# Patient Record
Sex: MALE | Race: WHITE | NOT HISPANIC OR LATINO
[De-identification: names, ages, dates, MRNs, and addresses within clinical notes are randomized per-mention and may not be internally consistent; named-entity substitution may affect disease eponyms.]

---

## 2021-06-15 ENCOUNTER — TRANSCRIPTION ENCOUNTER (OUTPATIENT)
Age: 46
End: 2021-06-15

## 2021-12-30 ENCOUNTER — TRANSCRIPTION ENCOUNTER (OUTPATIENT)
Age: 46
End: 2021-12-30

## 2022-09-28 ENCOUNTER — EMERGENCY (EMERGENCY)
Facility: HOSPITAL | Age: 47
LOS: 1 days | Discharge: HOME | End: 2022-09-28
Attending: EMERGENCY MEDICINE
Payer: COMMERCIAL

## 2022-09-28 VITALS
HEART RATE: 100 BPM | OXYGEN SATURATION: 97 % | RESPIRATION RATE: 18 BRPM | TEMPERATURE: 99 F | DIASTOLIC BLOOD PRESSURE: 97 MMHG | SYSTOLIC BLOOD PRESSURE: 155 MMHG | WEIGHT: 235.01 LBS

## 2022-09-28 VITALS
TEMPERATURE: 98 F | OXYGEN SATURATION: 98 % | RESPIRATION RATE: 18 BRPM | HEART RATE: 67 BPM | DIASTOLIC BLOOD PRESSURE: 75 MMHG | SYSTOLIC BLOOD PRESSURE: 117 MMHG

## 2022-09-28 LAB
ALBUMIN SERPL ELPH-MCNC: 4.4 G/DL — SIGNIFICANT CHANGE UP (ref 3.3–5)
ALP SERPL-CCNC: 78 U/L — SIGNIFICANT CHANGE UP (ref 40–120)
ALT FLD-CCNC: 21 U/L — SIGNIFICANT CHANGE UP (ref 10–45)
ANION GAP SERPL CALC-SCNC: 13 MMOL/L — SIGNIFICANT CHANGE UP (ref 5–17)
APTT BLD: 29.4 SEC — SIGNIFICANT CHANGE UP (ref 27.5–35.5)
AST SERPL-CCNC: 16 U/L — SIGNIFICANT CHANGE UP (ref 10–40)
BASOPHILS # BLD AUTO: 0.02 K/UL — SIGNIFICANT CHANGE UP (ref 0–0.2)
BASOPHILS NFR BLD AUTO: 0.4 % — SIGNIFICANT CHANGE UP (ref 0–2)
BILIRUB SERPL-MCNC: 0.5 MG/DL — SIGNIFICANT CHANGE UP (ref 0.2–1.2)
BUN SERPL-MCNC: 15 MG/DL — SIGNIFICANT CHANGE UP (ref 7–23)
CALCIUM SERPL-MCNC: 8.9 MG/DL — SIGNIFICANT CHANGE UP (ref 8.4–10.5)
CHLORIDE SERPL-SCNC: 105 MMOL/L — SIGNIFICANT CHANGE UP (ref 96–108)
CO2 SERPL-SCNC: 22 MMOL/L — SIGNIFICANT CHANGE UP (ref 22–31)
CREAT SERPL-MCNC: 0.9 MG/DL — SIGNIFICANT CHANGE UP (ref 0.5–1.3)
D DIMER BLD IA.RAPID-MCNC: <150 NG/ML DDU — SIGNIFICANT CHANGE UP
EGFR: 107 ML/MIN/1.73M2 — SIGNIFICANT CHANGE UP
EOSINOPHIL # BLD AUTO: 0.15 K/UL — SIGNIFICANT CHANGE UP (ref 0–0.5)
EOSINOPHIL NFR BLD AUTO: 2.7 % — SIGNIFICANT CHANGE UP (ref 0–6)
GLUCOSE SERPL-MCNC: 104 MG/DL — HIGH (ref 70–99)
HCT VFR BLD CALC: 41.4 % — SIGNIFICANT CHANGE UP (ref 39–50)
HGB BLD-MCNC: 14.1 G/DL — SIGNIFICANT CHANGE UP (ref 13–17)
IMM GRANULOCYTES NFR BLD AUTO: 0.5 % — SIGNIFICANT CHANGE UP (ref 0–0.9)
INR BLD: 1.09 RATIO — SIGNIFICANT CHANGE UP (ref 0.88–1.16)
LYMPHOCYTES # BLD AUTO: 1.71 K/UL — SIGNIFICANT CHANGE UP (ref 1–3.3)
LYMPHOCYTES # BLD AUTO: 30.6 % — SIGNIFICANT CHANGE UP (ref 13–44)
MCHC RBC-ENTMCNC: 29.7 PG — SIGNIFICANT CHANGE UP (ref 27–34)
MCHC RBC-ENTMCNC: 34.1 GM/DL — SIGNIFICANT CHANGE UP (ref 32–36)
MCV RBC AUTO: 87.2 FL — SIGNIFICANT CHANGE UP (ref 80–100)
MONOCYTES # BLD AUTO: 0.53 K/UL — SIGNIFICANT CHANGE UP (ref 0–0.9)
MONOCYTES NFR BLD AUTO: 9.5 % — SIGNIFICANT CHANGE UP (ref 2–14)
NEUTROPHILS # BLD AUTO: 3.14 K/UL — SIGNIFICANT CHANGE UP (ref 1.8–7.4)
NEUTROPHILS NFR BLD AUTO: 56.3 % — SIGNIFICANT CHANGE UP (ref 43–77)
NRBC # BLD: 0 /100 WBCS — SIGNIFICANT CHANGE UP (ref 0–0)
PLATELET # BLD AUTO: 195 K/UL — SIGNIFICANT CHANGE UP (ref 150–400)
POTASSIUM SERPL-MCNC: 3.8 MMOL/L — SIGNIFICANT CHANGE UP (ref 3.5–5.3)
POTASSIUM SERPL-SCNC: 3.8 MMOL/L — SIGNIFICANT CHANGE UP (ref 3.5–5.3)
PROT SERPL-MCNC: 7.4 G/DL — SIGNIFICANT CHANGE UP (ref 6–8.3)
PROTHROM AB SERPL-ACNC: 12.6 SEC — SIGNIFICANT CHANGE UP (ref 10.5–13.4)
RBC # BLD: 4.75 M/UL — SIGNIFICANT CHANGE UP (ref 4.2–5.8)
RBC # FLD: 12.8 % — SIGNIFICANT CHANGE UP (ref 10.3–14.5)
SARS-COV-2 RNA SPEC QL NAA+PROBE: DETECTED
SODIUM SERPL-SCNC: 140 MMOL/L — SIGNIFICANT CHANGE UP (ref 135–145)
TROPONIN T, HIGH SENSITIVITY RESULT: <6 NG/L — SIGNIFICANT CHANGE UP (ref 0–51)
TROPONIN T, HIGH SENSITIVITY RESULT: <6 NG/L — SIGNIFICANT CHANGE UP (ref 0–51)
WBC # BLD: 5.58 K/UL — SIGNIFICANT CHANGE UP (ref 3.8–10.5)
WBC # FLD AUTO: 5.58 K/UL — SIGNIFICANT CHANGE UP (ref 3.8–10.5)

## 2022-09-28 PROCEDURE — 85730 THROMBOPLASTIN TIME PARTIAL: CPT

## 2022-09-28 PROCEDURE — 75574 CT ANGIO HRT W/3D IMAGE: CPT | Mod: MA

## 2022-09-28 PROCEDURE — 85379 FIBRIN DEGRADATION QUANT: CPT

## 2022-09-28 PROCEDURE — 71045 X-RAY EXAM CHEST 1 VIEW: CPT

## 2022-09-28 PROCEDURE — 71045 X-RAY EXAM CHEST 1 VIEW: CPT | Mod: 26

## 2022-09-28 PROCEDURE — U0005: CPT

## 2022-09-28 PROCEDURE — 80053 COMPREHEN METABOLIC PANEL: CPT

## 2022-09-28 PROCEDURE — 99285 EMERGENCY DEPT VISIT HI MDM: CPT

## 2022-09-28 PROCEDURE — G0378: CPT

## 2022-09-28 PROCEDURE — 85610 PROTHROMBIN TIME: CPT

## 2022-09-28 PROCEDURE — 99234 HOSP IP/OBS SM DT SF/LOW 45: CPT | Mod: 25

## 2022-09-28 PROCEDURE — U0003: CPT

## 2022-09-28 PROCEDURE — 93010 ELECTROCARDIOGRAM REPORT: CPT

## 2022-09-28 PROCEDURE — 75574 CT ANGIO HRT W/3D IMAGE: CPT | Mod: 26,MA

## 2022-09-28 PROCEDURE — 85025 COMPLETE CBC W/AUTO DIFF WBC: CPT

## 2022-09-28 PROCEDURE — 93005 ELECTROCARDIOGRAM TRACING: CPT

## 2022-09-28 PROCEDURE — 84484 ASSAY OF TROPONIN QUANT: CPT

## 2022-09-28 RX ORDER — ACETAMINOPHEN 500 MG
975 TABLET ORAL ONCE
Refills: 0 | Status: COMPLETED | OUTPATIENT
Start: 2022-09-28 | End: 2022-09-28

## 2022-09-28 RX ORDER — ASPIRIN/CALCIUM CARB/MAGNESIUM 324 MG
324 TABLET ORAL ONCE
Refills: 0 | Status: COMPLETED | OUTPATIENT
Start: 2022-09-28 | End: 2022-09-28

## 2022-09-28 RX ADMIN — Medication 975 MILLIGRAM(S): at 19:02

## 2022-09-28 RX ADMIN — Medication 324 MILLIGRAM(S): at 06:56

## 2022-09-28 NOTE — ED CDU PROVIDER DISPOSITION NOTE - ATTENDING APP SHARED VISIT CONTRIBUTION OF CARE
CDU Attending Note -- Pt seen and examined at bedside.  Case discussed c CDU PA.  Labs/imaging reviewed.  CT-C shows low Ca score.  Case discussed with patient's cardiologist, Dr. Jimenez.  Pt comfortable, asymptomatic, and has good follow up.  No ectopy on telemetry and no episodes of chest pain.  At this time there is no further work-up or treatment required to necessitate further CDU monitoring or admission.  Strict return precautions provided to patient.  Will discharge.  --BMM

## 2022-09-28 NOTE — ED CDU PROVIDER DISPOSITION NOTE - CLINICAL COURSE
45 y/o male, hx HTN on Carvedilol (last dose this morning before arrival around 6am) recent oral sx on abx, presents to the ED with chest pain.  Reports on Monday was in [place] with his twins and spent day pushing 40lb x 2 twins in a stroller in [facility] [employer].  Reports Monday had mild chest pain which self-resolved.  Reports yesterday evening flew home from [place] and on arrival to [facility] felt L sided sharp chest pain, associated with diaphoresis and radiation to LUE. denies f/n/v/d, SOB, LOC, numbness, tingling, dizziness, HA, abdominal pain, urinary symptoms.  Reports paternal grandfather had multiple MIs, first in his 30s.    while in the ER, patient had stable vitals. EKG done as well as labs and cxr. d-dimer <150. first trop <6. cxr no acute pathology noted. awaiting result for second trop at this time. patient placed in CDU to have CT coronaries. will continue to monitor patient vitals and monitor patients pain. 45 y/o male, hx HTN on Carvedilol (last dose this morning before arrival around 6am) recent oral sx on abx, presents to the ED with chest pain.  Reports on Monday was in [place] with his twins and spent day pushing 40lb x 2 twins in a stroller in [employer] [place] [employer].  Reports Monday had mild chest pain which self-resolved.  Reports yesterday evening flew home from [place] and on arrival to [facility] felt L sided sharp chest pain, associated with diaphoresis and radiation to LUE. denies f/n/v/d, SOB, LOC, numbness, tingling, dizziness, HA, abdominal pain, urinary symptoms.  Reports paternal grandfather had multiple MIs, first in his 30s.    while in the ER, patient had stable vitals. EKG done as well as labs and cxr. d-dimer <150. first trop <6. cxr no acute pathology noted. awaiting result for second trop at this time. patient placed in CDU to have CT coronaries. will continue to monitor patient vitals and monitor patients pain.    while in CDU patient had CT coronaries done. minimal plaque appreciated to one of coronaries. patient to follow up it up with his cardiologist. 45 y/o male, hx HTN on Carvedilol (last dose this morning before arrival around 6am) recent oral sx on abx, presents to the ED with chest pain.  Reports on Monday was in [place] with his twins and spent day pushing 40lb x 2 twins in a stroller in [facility] [employer].  Reports Monday had mild chest pain which self-resolved.  Reports yesterday evening flew home from [place] and on arrival to [facility] felt L sided sharp chest pain, associated with diaphoresis and radiation to LUE. denies f/n/v/d, SOB, LOC, numbness, tingling, dizziness, HA, abdominal pain, urinary symptoms.  Reports paternal grandfather had multiple MIs, first in his 30s.    while in the ER, patient had stable vitals. EKG done as well as labs and cxr. d-dimer <150. first trop <6. cxr no acute pathology noted. awaiting result for second trop at this time. patient placed in CDU to have CT coronaries. will continue to monitor patient vitals and monitor patients pain.    while in CDU patient had CT coronaries done. minimal plaque appreciated to one of coronaries. patient to follow up with his cardiologist.

## 2022-09-28 NOTE — ED CDU PROVIDER DISPOSITION NOTE - PATIENT PORTAL LINK FT
You can access the FollowMyHealth Patient Portal offered by [facility] by registering at the following website: [email or web address]. By joining [facility]’s FollowMyHealth portal, you will also be able to view your health information using other applications (apps) compatible with our system.

## 2022-09-28 NOTE — ED ADULT NURSE REASSESSMENT NOTE - NS ED NURSE REASSESS COMMENT FT1
11.00am  Received the Pt from  MADDIE Burr . Pt is Observed for CP for CTC. Received the Pt A&OX 4 obeys commands Elsy N/V/D fever chills cp SOB   Comfort care & safety measures continued  IV site looks clean & dry no signs of infiltration noted pt denies  pain IV site .  Pt is advised to call for help  call bell with in the reach pt verbalized the understanding .  pending CDU  MD chen . GCS 15/15 A&OX 4 PERRLA  size 3 Strong upper & lower extremities steady gait   No facial droop  No Hand Leg drop denies numbness tingling Continue to monitor

## 2022-09-28 NOTE — ED PROVIDER NOTE - CLINICAL SUMMARY MEDICAL DECISION MAKING FREE TEXT BOX
Patient is a 42 year old male with past medical history significant for hypertension presenting to the Emergency Department with chief complaint of chest pain.  Patient was evaluated for cardiopulmonary etiology of symptoms.  Patient received EKG, plain films, labs (including D-dimer), and was given aspirin.  Labs and testing were negative for acute pathology.  Patient's cardiologist was contacted, who stated that patient receives yearly echo and stress tests, which have been negative for acute pathology.  Patient placed in CDU for CT of the coronaries.

## 2022-09-28 NOTE — ED CDU PROVIDER INITIAL DAY NOTE - PROGRESS NOTE DETAILS
patient noted to be covid positive while in ER. aware of results. denies cough, fever, SOB, dizziness, diaphoresis, chills. CT coronaries reviewed. kerry. spoke with patient cardiologist, Dr. Rizvi. reviewed patient CT coronary results. states patient okay for discharge. will have patient to follow up with his cardiologist in 1-2 days. patient well appearing. AOX3. stable for discharge. discussed with Dr. Oneill. patient noted to be covid positive while in ER. aware of results. denies cough, fever, SOB, dizziness, diaphoresis, chills. CT coronaries reviewed. minimal plaque noted to one of patients coronary arteries. spoke with patient cardiologist, Dr. Rizvi. reviewed patient CT coronary results. states patient okay for discharge. will have patient to follow up with his cardiologist in 1-2 days. patient well appearing. AOX3. stable for discharge. discussed with Dr. Oneill.

## 2022-09-28 NOTE — ED PROVIDER NOTE - PROGRESS NOTE DETAILS
DO Mattie: spoke with patient's cardiologist's PA (Dr. Dee) who stated that patient had a treadmill stress test and echo approximately 15 months prior, which was unremarkable for acute pathology.  recommended stress testing and echo. Mattie DO: patient unable to receive stress test secondary to taking his beta blocker earlier in the day.  patient to receive CT of the coronaries.  CT contacted and patient will receive CT later in the day.  patient placed in the CDU.

## 2022-09-28 NOTE — ED PROVIDER NOTE - OBJECTIVE STATEMENT
HTN on Carvedilol, recent oral sx on abx presents to the ED with chest pain.  Reports on Monday was in [place] with his twins and spent day pushing 40lb x 2 twins in a stroller in [facility] [employer].  Reports Monday had mild chest pain which self-resolved.  Reports today was flying home from [place] and on arrival to [facility] felt L sided sharp chest pain, associated with diaphoresis and radiation to LUE.  Denies associated shortness of breath.  Denies numbness/tingling in the arms or legs,  abdominal pain, nausea, vomiting, diarrhea, bloody or black stools.  Denies urinary complaints.  Denies fevers, URI symptoms.  Denies previous episode.  Reports paternal grandfather had multiple MIs, first in his 30s.  Reports he is concerned for a blood clot, denies personal history of PE/DVT.  Denies FHx stroke, clots.

## 2022-09-28 NOTE — ED CDU PROVIDER INITIAL DAY NOTE - ATTENDING CONTRIBUTION TO CARE
exertional CP, now resolved.  cannot stress as noted.  plan for CDU for obs, CT coronary eval for vascular disease and reassess.

## 2022-09-28 NOTE — ED CDU PROVIDER INITIAL DAY NOTE - DETAILS
chest pain:  -cardiac monitor  -vitals   -pain control  -CT coronaries   -ambulate as tolerated   -frequent re-eval  -discussed with Dr. Clarke chest pain:  -cardiac monitor  -vitals   -pain control  -CT coronaries   -ambulate as tolerated   -frequent re-eval  -discussed with ER attending

## 2022-09-28 NOTE — ED CDU PROVIDER INITIAL DAY NOTE - NS ED ATTENDING STATEMENT MOD
I have seen and examined this patient and fully participated in the care of this patient as the teaching attending.  The service was shared with the KESHAV.  I reviewed and verified the documentation and independently performed the documented:

## 2022-09-28 NOTE — ED ADULT NURSE REASSESSMENT NOTE - NS ED NURSE REASSESS COMMENT FT1
19.00 Pt is evaluated by CDU MD angelita Reddy . pt is feeling better.  Pt is discharged . Ml out   GAYATRI Barron  explained the follow up care & gave the discharge summary  . Pt has stable vitals steady gait A&OX 4 at the time of Discharge

## 2022-09-28 NOTE — ED CDU PROVIDER DISPOSITION NOTE - NSFOLLOWUPINSTRUCTIONS_ED_ALL_ED_FT
you were seen in the Emergency Room for chest pain.     Chest pain can be caused by many different conditions which may or may not be dangerous. Causes include heartburn, lung infections, heart attack, blood clot in lungs, skin infections, strain or damage to muscle, cartilage, or bones, etc. In addition to a history and physical examination, an electrocardiogram (ECG) or other lab tests may have been performed to determine the cause of your chest pain. Follow up with your primary care provider or with a cardiologist as instructed.     SEEK IMMEDIATE MEDICAL CARE IF YOU HAVE ANY OF THE FOLLOWING SYMPTOMS: worsening chest pain, coughing up blood, unexplained back/neck/jaw pain, severe abdominal pain, dizziness or lightheadedness, fainting, shortness of breath, sweaty or clammy skin, vomiting, or racing heart beat. These symptoms may represent a serious problem that is an emergency. Do not wait to see if the symptoms will go away. Get medical help right away. Call 911 and do not drive yourself to the hospital. CT coronaries showed minimal plaque to one of your coronaries. follow up with your cardiologist regarding result and regarding today's visit on 1-2 days.   bring all results from today's visit with you to your visit.       Chest pain can be caused by many different conditions which may or may not be dangerous. Causes include heartburn, lung infections, heart attack, blood clot in lungs, skin infections, strain or damage to muscle, cartilage, or bones, etc. In addition to a history and physical examination, an electrocardiogram (ECG) or other lab tests may have been performed to determine the cause of your chest pain. Follow up with your primary care provider or with a cardiologist as instructed.     SEEK IMMEDIATE MEDICAL CARE IF YOU HAVE ANY OF THE FOLLOWING SYMPTOMS: worsening chest pain, coughing up blood, unexplained back/neck/jaw pain, severe abdominal pain, dizziness or lightheadedness, fainting, shortness of breath, sweaty or clammy skin, vomiting, or racing heart beat. These symptoms may represent a serious problem that is an emergency. Do not wait to see if the symptoms will go away. Get medical help right away. Call 911 and do not drive yourself to the hospital.    it is also important to follow cdc guidelines regarding COVID results.        COVID-19 (Coronavirus Disease 2019)    WHAT YOU NEED TO KNOW:    COVID-19 is the disease caused by a coronavirus first discovered in December 2019. Coronaviruses generally cause upper respiratory (nose, throat, and lung) infections, such as a cold. The 2019 virus spreads quickly and easily. It can be spread starting 2 to 3 days before symptoms even begin.    DISCHARGE INSTRUCTIONS:    Call your local emergency number (911 in the ) if:   •You have trouble breathing or shortness of breath at rest.      •You have chest pain or pressure that lasts longer than 5 minutes.      •You become confused or hard to wake.      •Your lips or face are blue.      Seek care immediately if:   •You have a fever of 104°F (40°C) or higher.          Call your doctor if:   •You have symptoms of COVID-19.      •You have questions or concerns about your condition or care.      Medicines: You may need any of the following:  •Decongestants help reduce nasal congestion and help you breathe more easily. If you take decongestant pills, they may make you feel restless or cause problems with your sleep. Do not use decongestant sprays for more than a few days.      •Cough suppressants help reduce coughing. Ask your healthcare provider which type of cough medicine is best for you.      •Acetaminophen decreases pain and fever. It is available without a doctor's order. Ask how much to take and how often to take it. Follow directions. Read the labels of all other medicines you are using to see if they also contain acetaminophen, or ask your doctor or pharmacist. Acetaminophen can cause liver damage if not taken correctly.      •NSAIDs, such as ibuprofen, help decrease swelling, pain, and fever. This medicine is available with or without a doctor's order. NSAIDs can cause stomach bleeding or kidney problems in certain people. If you take blood thinner medicine, always ask your healthcare provider if NSAIDs are safe for you. Always read the medicine label and follow directions.      •Blood thinners help prevent blood clots. Clots can cause strokes, heart attacks, and death. The following are general safety guidelines to follow while you are taking a blood thinner:?Watch for bleeding and bruising while you take blood thinners. Watch for bleeding from your gums or nose. Watch for blood in your urine and bowel movements. Use a soft washcloth on your skin, and a soft toothbrush to brush your teeth. This can keep your skin and gums from bleeding. If you shave, use an electric shaver. Do not play contact sports.       ?Tell your dentist and other healthcare providers that you take a blood thinner. Wear a bracelet or necklace that says you take this medicine.       ?Do not start or stop any other medicines unless your healthcare provider tells you to. Many medicines cannot be used with blood thinners.      ?Take your blood thinner exactly as prescribed by your healthcare provider. Do not skip does or take less than prescribed. Tell your provider right away if you forget to take your blood thinner, or if you take too much.      ?Warfarin is a blood thinner that you may need to take. The following are things you should be aware of if you take warfarin: ?Foods and medicines can affect the amount of warfarin in your blood. Do not make major changes to your diet while you take warfarin. Warfarin works best when you eat about the same amount of vitamin K every day. Vitamin K is found in green leafy vegetables and certain other foods. Ask for more information about what to eat when you are taking warfarin.      ?You will need to see your healthcare provider for follow-up visits when you are on warfarin. You will need regular blood tests. These tests are used to decide how much medicine you need.         •Take your medicine as directed. Contact your healthcare provider if you think your medicine is not helping or if you have side effects. Tell your provider if you are allergic to any medicine. Keep a list of the medicines, vitamins, and herbs you take. Include the amounts, and when and why you take them. Bring the list or the pill bottles to follow-up visits. Carry your medicine list with you in case of an emergency.      What you need to know about variants: The virus has changed into several new forms (called variants) since it was discovered. The variants may be more contagious (easily spread) than the original form. Some may also cause more severe illness than others.    What you need to know about COVID-19 vaccines: Healthcare providers recommend a COVID-19 vaccine, even if you have already had COVID-19. You are considered fully vaccinated against COVID-19 two weeks after the final dose of any COVID-19 vaccine. Let your healthcare provider know when you have received the final dose of the vaccine. Make a copy of your vaccination card. Keep the original with you in case you need to show it. Keep the copy in a safe place.  •Get a COVID-19 vaccine as directed. Vaccination is recommended for everyone 6 months or older. COVID-19 vaccines are given as a shot in 1 to 3 doses as a primary series. This depends on the vaccine brand and the age of the person who receives it. A booster dose is recommended for everyone 5 years or older after the primary series is complete. A second booster is recommended for all adults 50 or older and for immunocompromised adolescents. The second booster is also recommended for anyone who got the 1-dose brand of vaccine for the first dose and a booster. Your provider can give you more information on boosters and help you schedule all needed doses.  Recommended COVID-19 Immunization Schedule           •Continue social distancing and other measures. You can become infected even after you get the vaccine. You may also be able to pass the virus to others without knowing you are infected.      •After you get the vaccine, check local, national, and international travel rules. You may need to be tested before you travel. Some countries require proof of a negative test before you travel. You may also need to quarantine after you return.      •Medicine may be given to prevent infection. The medicine can be given if you are at high risk for infection and cannot get the vaccine. It can also be given if your immune system does not respond well to the vaccine.      How the 2019 coronavirus spreads:   •Droplets are the main way all coronaviruses spread. The virus travels in droplets that form when a person talks, sings, coughs, or sneezes. The droplets can also float in the air for minutes or hours. Infection happens when you breathe in the droplets or get them in your eyes or nose. Close personal contact with an infected person increases your risk for infection. This means being within 6 feet (2 meters) of the person for at least 15 minutes over 24 hours.      •Person-to-person contact can spread the virus. For example, a person with the virus on his or her hands can spread it by shaking hands with someone.      •The virus can stay on objects and surfaces for up to 3 days. You may become infected by touching the object or surface and then touching your eyes or mouth.      Help lower the risk for COVID-19:   •Wash your hands often throughout the day. Use soap and water. Rub your soapy hands together, lacing your fingers, for at least 20 seconds. Rinse with warm, running water. Dry your hands with a clean towel or paper towel. Use hand  that contains alcohol if soap and water are not available. Teach children how to wash their hands and use hand .  Handwashing           •Cover sneezes and coughs. Turn your face away and cover your mouth and nose with a tissue. Throw the tissue away. Use the bend of your arm if a tissue is not available. Then wash your hands well with soap and water or use hand . Teach children how to cover a cough or sneeze.      •Wear a face covering (mask) when needed. Use a cloth covering with at least 2 layers. You can also create layers by putting a cloth covering over a disposable non-medical mask. Cover your mouth and your nose.  How to Wear a Face Covering (Mask)           •Follow worldwide, national, and local social distancing guidelines. Keep at least 6 feet (2 meters) between you and others.      •Try not to touch your face. If you get the virus on your hands, you can transfer it to your eyes, nose, or mouth and become infected. You can also transfer it to objects, surfaces, or people.      •Clean and disinfect high-touch surfaces and objects often. Use disinfecting wipes, or make a solution of 4 teaspoons of bleach in 1 quart (4 cups) of water.      •Ask about other vaccines you may need. Get the influenza (flu) vaccine as soon as recommended each year, usually starting in September or October. Get the pneumonia vaccine if recommended. Your healthcare provider can tell you if you should also get other vaccines, and when to get them.    Prevent COVID-19 Infection         Follow social distancing guidelines: National and local social distancing rules vary. Rules and restrictions may change over time as restrictions are lifted. The following are general guidelines:  •Stay home if you are sick or think you may have COVID-19. It is important to stay home if you are waiting for a testing appointment or for test results.      •Avoid close physical contact with anyone who does not live in your home. Do not shake hands with, hug, or kiss a person as a greeting. If you must use public transportation (such as a bus or subway), try to sit or stand away from others. Wear your face covering.      •Avoid in-person gatherings and crowds. Attend virtually if possible.      Follow up with your doctor as directed: Write down your questions so you remember to ask them during your visits.    For more information:   •Centers for Disease Control and Prevention  [address] [place] [address]  Phone: 1-977.805.4489  Web Address: http://www.cdc.gov           © Copyright [employer] 2022

## 2022-09-28 NOTE — ED CDU PROVIDER INITIAL DAY NOTE - OBJECTIVE STATEMENT
45 y/o male, hx HTN on Carvedilol (last dose this morning before arrival around 6am) recent oral sx on abx, presents to the ED with chest pain.  Reports on Monday was in [place] with his twins and spent day pushing 40lb x 2 twins in a stroller in [facility] [employer].  Reports Monday had mild chest pain which self-resolved.  Reports yesterday evening flew home from [place] and on arrival to [facility] felt L sided sharp chest pain, associated with diaphoresis and radiation to LUE. denies f/n/v/d, SOB, LOC, numbness, tingling, dizziness, HA, abdominal pain, urinary symptoms.  Reports paternal grandfather had multiple MIs, first in his 30s.

## 2022-09-28 NOTE — ED ADULT NURSE NOTE - OBJECTIVE STATEMENT
47yo M pmh HTN presents to ED ambulatory from home with c/o CP since last night. Pt reports pain began when he got off plane coming home from [place] last night. Endorses pain is 8/10 sharp pain in center of chest radiating to the L sided chest. Pt endorsing associated diaphoresis on assessment in ED, states it is because he is nervous. Pt denies any associated fevers, chills, abd pain, n/v/d, sob, or difficulty breathing. On arrival to ED, pt is awake, alert, well-appearing, ambulatory without assistance, a&ox4, afebrile, hypertensive to 150s/90s, vitals otherwise stable, abd soft nt nd, extremities nonedematous. ED MD at bedside for eval. IV placed. EKG done in triage. Pt placed on CCM and . Pt medicated as per MD orders. Patient undressed and placed into gown, call bell in hand and side rails up for safety.

## 2022-09-28 NOTE — ED CDU PROVIDER DISPOSITION NOTE - NS ED ATTENDING STATEMENT MOD
This was a shared visit with the KESHAV. I reviewed and verified the documentation and independently performed the documented:

## 2022-09-28 NOTE — ED PROVIDER NOTE - ATTENDING CONTRIBUTION TO CARE
Attending MD Clarke: I personally have seen and examined this patient.  Resident note reviewed and agree on plan of care and except where noted.  See below for details.     Seen in [place] [facility]    46M with PMH/PSH including HTN on Carvedilol, recent oral sx on abx presents to the ED with chest pain.  Reports on Monday was in [place] with his twins and spent day pushing 40lb x 2 twins in a stroller in [facility].  Reports Monday had mild chest pain which self-resolved.  Reports today was flying home from [place] and on arrival to [facility] felt L sided sharp chest pain, associated with diaphoresis and radiation to LUE.  Denies associated shortness of breath.  Denies abdominal pain, nausea, vomiting, diarrhea, bloody or black stools.  Denies urinary complaints.  Denies fevers, URI symptoms.  Denies previous episode.  Reports paternal grandfather had multiple MIs, first in his 30s.  Reports he is concerned for a blood clot, denies personal history of PE/DVT.  Denies FHx stroke, clots.    Exam:   General: NAD  HENT: head NCAT, airway patent   Eyes: PERRL, no conjunctival injection   Lungs: lungs CTAB with good inspiratory effort, no wheezing, no rhonchi, no rales  Cardiac: +S1S2, no obvious m/r/g  GI: abdomen soft with +BS, NT, exam limited secondary to body habitus  : no CVAT  MSK: FROM at neck, no tenderness to midline palpation, no calf tenderness, swelling, erythema or warmth, FROM LUE  Neuro: moving all extremities spontaneously, sensory grossly intact, no gross neuro deficits  Psych: normal mood and affect     A/P: 46M with chest pain radiating to LUE, concern for ACS, will obtain labs, EKG, CXR, cardiac monitor, given recent flight will also check dimer, will sign out to morning team